# Patient Record
Sex: FEMALE | Race: WHITE
[De-identification: names, ages, dates, MRNs, and addresses within clinical notes are randomized per-mention and may not be internally consistent; named-entity substitution may affect disease eponyms.]

---

## 2017-12-14 ENCOUNTER — HOSPITAL ENCOUNTER (EMERGENCY)
Dept: HOSPITAL 10 - E/R | Age: 42
Discharge: HOME | End: 2017-12-14
Payer: COMMERCIAL

## 2017-12-14 VITALS — TEMPERATURE: 99.2 F

## 2017-12-14 VITALS
HEIGHT: 66 IN | BODY MASS INDEX: 42.23 KG/M2 | WEIGHT: 262.79 LBS | HEIGHT: 66 IN | BODY MASS INDEX: 42.23 KG/M2 | WEIGHT: 262.79 LBS

## 2017-12-14 VITALS — DIASTOLIC BLOOD PRESSURE: 86 MMHG | SYSTOLIC BLOOD PRESSURE: 102 MMHG | HEART RATE: 83 BPM | RESPIRATION RATE: 18 BRPM

## 2017-12-14 DIAGNOSIS — R40.2252: ICD-10-CM

## 2017-12-14 DIAGNOSIS — R42: ICD-10-CM

## 2017-12-14 DIAGNOSIS — R40.2142: ICD-10-CM

## 2017-12-14 DIAGNOSIS — R40.2362: ICD-10-CM

## 2017-12-14 DIAGNOSIS — D64.9: Primary | ICD-10-CM

## 2017-12-14 LAB
%HYPO/RBC NFR BLD AUTO: (no result) % (ref 0–0)
ABNORMAL IP MESSAGE: 1
ANISOCYTOSIS BLD QL SMEAR: (no result) (ref 0–0)
BASOPHILS NFR BLD MANUAL: 1 % (ref 0–2)
EOSINOPHIL NFR BLD MANUAL: 1 % (ref 0–7)
ERYTHROCYTE [DISTWIDTH] IN BLOOD BY AUTOMATED COUNT: 18 % (ref 11.5–14.5)
HCT VFR BLD CALC: 22.1 % (ref 37–47)
HGB BLD-MCNC: 6.5 G/DL (ref 12–16)
MCH RBC QN AUTO: 18.5 PG (ref 29–33)
MCHC RBC AUTO-ENTMCNC: 29.4 G/DL (ref 32–37)
MCV RBC AUTO: 62.8 FL (ref 82–101)
MICROCYTES BLD QL SMEAR: (no result) (ref 0–0)
MONOCYTES % (M): 2 % (ref 0–11)
NRBC BLD AUTO-RTO: 0.3 /100WBC (ref 0–0)
PLATELET # BLD EST: NORMAL 10*3/UL
PLATELET # BLD: 186 10^3/UL (ref 140–415)
PMV BLD AUTO: 9.2 FL (ref 7.4–10.4)
POLYCHROMASIA BLD QL SMEAR: (no result) (ref 0–0)
POSITIVE DIFF: (no result)
RBC # BLD AUTO: 3.52 10^6/UL (ref 4.2–5.4)
RBC MORPH BLD: (no result)
WBC # BLD AUTO: 5.9 10^3/UL (ref 4.8–10.8)

## 2017-12-14 PROCEDURE — P9016 RBC LEUKOCYTES REDUCED: HCPCS

## 2017-12-14 PROCEDURE — 36430 TRANSFUSION BLD/BLD COMPNT: CPT

## 2017-12-14 PROCEDURE — 86901 BLOOD TYPING SEROLOGIC RH(D): CPT

## 2017-12-14 PROCEDURE — 86920 COMPATIBILITY TEST SPIN: CPT

## 2017-12-14 PROCEDURE — 86850 RBC ANTIBODY SCREEN: CPT

## 2017-12-14 PROCEDURE — 85025 COMPLETE CBC W/AUTO DIFF WBC: CPT

## 2017-12-14 PROCEDURE — 99285 EMERGENCY DEPT VISIT HI MDM: CPT

## 2017-12-14 PROCEDURE — 86900 BLOOD TYPING SEROLOGIC ABO: CPT

## 2017-12-14 NOTE — ERD
ER Documentation


Chief Complaint


Chief Complaint


DIZZY HX OF LOW HGB 6.6 ON SATURDAY





HPI


42-year-old female presents the emergency department complaining of anemia.





Patient has a long-standing history of menometrorrhagia.


She has required previous blood transfusions.


Recent blood tests indicated an ongoing anemia.  She was then referred here to 

the emergency department for evaluation.  Upon arrival, patient has some 

nonspecific weakness, but no shortness of breath chest pain or feelings like 

she is in a pass out.  In regards to her menorrhagia.  She reports no pelvic 

pain or passage of clots.





ROS


All systems reviewed and are negative except as per history of present illness.





Allergies


Allergies:  


Coded Allergies:  


     No Known Allergy (Unverified , 16)





PMhx/Soc


History of Surgery:  No


Hx Neurological Disorder:  No


Hx Respiratory Disorders:  No


Hx Cardiac Disorders:  No


Hx Psychiatric Problems:  No


Hx Miscellaneous Medical Probl:  No


Hx Substance Use:  No





Physical Exam


Vitals





Vital Signs








  Date Time  Temp Pulse Resp B/P Pulse Ox O2 Delivery O2 Flow Rate FiO2


 


17 10:51 98.0 84 16 137/64 100   








Physical Exam


GENERAL: Patient is pale but in no acute distress


HEENT: Pupils equal, round, and reactive to light.  EOMI. There is no scleral 

icterus.


NECK: C-spine is soft and supple, there is no meningismus.  There is no 

cervical lymphadenopathy.


LUNGS: Clear to auscultation bilaterally. There are no rales, wheezes or 

rhonchi.


HEART: Regular rate and rhythm, no murmurs, clicks, rubs or gallops.


ABDOMEN: Soft, non-tender, non-distended.  There are bowel sounds in all four 

quadrants. No rebound or guarding.


EXTREMITIES: There is no peripheral cyanosis or edema.  No focal swelling or 

erythema.


NEURO: The patient moves all four extremities with 5/5 strength.  Cranial 

nerves II - XII are intact. Normal gait. Alert and oriented


SKIN: There is no apparent rash or petechiae.  Pallor noted


HEME/LYMPHATIC: There is no evidence of excessive bruising or lymphedema.


PSYCHIATRIC: The patient does not appear anxious or depressed.


Result Diagram:  


17 1130





Results 24 hrs





 Laboratory Tests








Test


  17


11:30


 


White Blood Count 5.910^3/ul 


 


Red Blood Count 3.5210^6/ul 


 


Hemoglobin 6.5g/dl 


 


Hematocrit 22.1% 


 


Mean Corpuscular Volume 62.8fl 


 


Mean Corpuscular Hemoglobin 18.5pg 


 


Mean Corpuscular Hemoglobin


Concent 29.4g/dl 


 


 


Red Cell Distribution Width 18.0% 


 


Platelet Count 51836^3/UL 


 


Mean Platelet Volume 9.2fl 


 


Neutrophils % % 


 


Lymphocytes % % 


 


Monocytes % % 


 


Eosinophils % % 


 


Basophils % % 


 


Nucleated Red Blood Cells % 0.3/100WBC 


 


Neutrophils # 10^3/ul 


 


Lymphocytes # 10^3/ul 


 


Monocytes # 10^3/ul 


 


Eosinophils # 10^3/ul 


 


Basophils # 10^3/ul 


 


Nucleated Red Blood Cells # 10^3/ul 











Procedures/MDM


Patient was taken to a room, seen and evaluated. Comfort measures were 

initiated. 





Diagnostic tests were ordered and reviewed.





3 LEAD RHYTHM STRIP: Normal sinus rhythm without ectopy





MEDICAL DECISION MAKIN-year-old female presents with a recurrent, chronic 

anemia.  This is secondary to her ongoing menometrorrhagia for which she is 

being evaluated and treated with her gynecologist.  Patient shows no 

significant unstable symptoms, but given her mild symptoms and her severely low 

hemoglobin, she is appropriate for transfusion.  She will be given a unit of 

blood and will be further observed.  At this time, patient shows no evidence of 

significant ongoing blood loss.





Departure


Diagnosis:  


 Primary Impression:  


 Anemia











RAAD RUBIN Dec 14, 2017 11:56

## 2018-09-03 ENCOUNTER — HOSPITAL ENCOUNTER (EMERGENCY)
Age: 43
Discharge: HOME | End: 2018-09-03

## 2018-09-03 ENCOUNTER — HOSPITAL ENCOUNTER (EMERGENCY)
Dept: HOSPITAL 91 - FTE | Age: 43
Discharge: HOME | End: 2018-09-03
Payer: COMMERCIAL
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
## 2018-09-03 DIAGNOSIS — S89.91XD: Primary | ICD-10-CM

## 2018-09-03 PROCEDURE — 29505 APPLICATION LONG LEG SPLINT: CPT

## 2018-09-03 PROCEDURE — 99283 EMERGENCY DEPT VISIT LOW MDM: CPT

## 2018-09-03 RX ADMIN — HYDROCODONE BITARTRATE AND ACETAMINOPHEN 1 TAB: 5; 325 TABLET ORAL at 07:17
